# Patient Record
Sex: MALE | Race: WHITE | Employment: OTHER | URBAN - METROPOLITAN AREA
[De-identification: names, ages, dates, MRNs, and addresses within clinical notes are randomized per-mention and may not be internally consistent; named-entity substitution may affect disease eponyms.]

---

## 2020-03-02 ENCOUNTER — ANCILLARY PROCEDURE (OUTPATIENT)
Dept: GENERAL RADIOLOGY | Facility: CLINIC | Age: 65
End: 2020-03-02
Attending: ORTHOPAEDIC SURGERY
Payer: COMMERCIAL

## 2020-03-02 ENCOUNTER — OFFICE VISIT (OUTPATIENT)
Dept: ORTHOPEDICS | Facility: CLINIC | Age: 65
End: 2020-03-02
Payer: COMMERCIAL

## 2020-03-02 VITALS
RESPIRATION RATE: 16 BRPM | DIASTOLIC BLOOD PRESSURE: 78 MMHG | BODY MASS INDEX: 23.8 KG/M2 | WEIGHT: 170 LBS | SYSTOLIC BLOOD PRESSURE: 120 MMHG | HEART RATE: 79 BPM | OXYGEN SATURATION: 97 % | HEIGHT: 71 IN

## 2020-03-02 DIAGNOSIS — M25.561 RIGHT KNEE PAIN, UNSPECIFIED CHRONICITY: ICD-10-CM

## 2020-03-02 DIAGNOSIS — M25.561 RIGHT KNEE PAIN, UNSPECIFIED CHRONICITY: Primary | ICD-10-CM

## 2020-03-02 DIAGNOSIS — M17.11 PRIMARY OSTEOARTHRITIS OF RIGHT KNEE: ICD-10-CM

## 2020-03-02 PROCEDURE — 73562 X-RAY EXAM OF KNEE 3: CPT | Mod: RT

## 2020-03-02 PROCEDURE — 99204 OFFICE O/P NEW MOD 45 MIN: CPT | Performed by: ORTHOPAEDIC SURGERY

## 2020-03-02 RX ORDER — NAPROXEN 500 MG/1
500 TABLET ORAL 2 TIMES DAILY WITH MEALS
Qty: 60 TABLET | Refills: 11 | Status: SHIPPED | OUTPATIENT
Start: 2020-03-02 | End: 2021-03-30

## 2020-03-02 ASSESSMENT — MIFFLIN-ST. JEOR: SCORE: 1593.24

## 2020-03-02 NOTE — PATIENT INSTRUCTIONS
SMOKING CESSATION  What's in cigarette smoke? - Cigarette smoke contains over 4,000 chemicals. Nicotine is one of the main ingredients which is an insecticide/herbicide. It is poisonous to our nervous system, increases blood clotting risk, and decreases the body's defenses to fight off infection. Another chemical is Carbon Monoxide is an asphyxiating gas that permanently binds to blood cells and blocks the transport of oxygen. This leads to tissue death and decreases your metabolism. Tar is a chemical that coats your lungs and trachea which impairs new oxygen coming in and carbon dioxide getting out of your body.   How does smoking impact surgery? - Smoking is particularly hazardous with regards to surgery. Surgery puts stress on the body and a smoker's body is already under strain from these chemicals. Putting the two together, especially for an elective surgery, could be a recipe for disaster. Smoking before and after surgery increases your risk of heart problems, slow wound healing, delayed bone healing, blood clots, wound infection and anesthesia complications.   What are the benefits of quitting? - In 20 minutes your blood pressure will drop back down to normal. In 8 hours the carbon monoxide (a toxic gas) levels in your blood stream will drop by half, and oxygen levels will return to normal. In 48 hours your chance of having a heart attack will have decreased. All nicotine will have left your body. Your sense of taste and smell will return to a normal level. In 72 hours your bronchial tubes will relax, and your energy levels will increase. In 2 weeks your circulation will increase, and it will continue to improve for the next 10 weeks.    Recommendations for elective surgery - Ideally, patients should quit smoking 8 weeks before and at least 2 weeks after elective surgery in order to avoid complications. Simply cutting back on the amount of cigarettes smoked per day does not offer any benefit or decrease the  risk of poor wound healing, heart problems, and infection. Smokers should also start taking Vitamin C and B for two weeks before surgery and two weeks after surgery.    Ways to Stop Smokin. Nicotine patches, lozenges, or gum  2. Support Groups  3. Medications (see below)    List of Medications:  1. Varenicline Tartrate (CHANTIX)   2. Bupropion HCL (WELLBUTRIN, ZYBAN) - note: make sure Wellbutrin is for smoking cessation and not other issues   3. Nicotine Patch (NICODERM)   4. Nicotine Inhaler (NICOTROL)   5. Nicotine Gum Nicotine Polacrilex   6. Nicotine Lozenge: Nicotine Polacrilex (COMMIT)   * [employer] offers a smoking support group as well!  Please visit: [email or web address]  If you are interested in these, ask about getting a prescription or talk to your primary care doctor about what may be the best way for you to quit.       Options for osteoarthritis.    Weight loss.  Legs feel better the lighter you are.  Stay active - walking, bicycle, swimming.  Avoid high impact.  Vitamins - Glucosamine 1500 mg/day and chondroitin sulfate 1200 mg/day. It works for dogs and horses, no proof it helps people.  Non-medical options (other things I've heard of):  * tart cherry juice is thought to be a natural anti-inflammatory.    *1 tablespoon of apple cider vinegar daily  *Turmeric is thought to be a natural anti-inflammatory.  *1 tablespoon of unflavored gelatin daily, [place] Gelatin or Kay Nutrajoint  *Ointments on joints - Icy Hot, BenGay, Capsaicin cream, Mineral Ice, Flexall, diclofenac gel.  Tylenol up to 3000 mg / day.  NSAIDs - Aleve up to 4 tablets per day or ibuprofen up to 12 tablets per day.   Prescription forms.  Steroid injection - cortisone.  Lubricant injection.   brace.  Surgery.  Usually joint replacement.

## 2020-03-02 NOTE — LETTER
3/2/2020         RE: Mickey Durant  [address]  [place] [address]        Dear Colleague,    Thank you for referring your patient, Mikcey Durant, to the [facility]. Please see a copy of my visit note below.    Mickey Durant is a 64 year old male who is seen in consultation at the request of Keisha Blanton for right knee pain.  He had pain in his knee since about 2010.  Pain is mainly over the inner aspect of the knee.  He does not recall a specific injury.  It seems to be worse with exercise.  He has constant pain rated 5 out of 10.  He has previously had left knee arthroscopy years ago followed by hemiarthroplasty which was then converted to total knee arthroplasty 2 years ago.  He has been putting up with his right knee and feels at this point that he wants right knee replacement.  He has used low-dose Tylenol and ibuprofen for pain.  He alternates 3 OTC ibuprofen and 3 Tylenol every other day.  He has not had steroid injection of his right knee.  He reports steroid injection in the past has not helped his back, so he was not interested in it for his knee.  He has not had physical therapy for the knee.    He has had MRI scan 2/26/20 of the knee showing severe arthritis of the medial joint.  There was also complex tearing of the medial and lateral meniscus.  X-ray of the right knee performed today does show significant wear on the medial joint.  At the far medial aspect it is touching bone-on-bone.  Most of the medial joint has very thin cartilage.    History reviewed. No pertinent past medical history.    History reviewed. No pertinent surgical history.    History reviewed. No pertinent family history.    Social History     Socioeconomic History     Marital status:      Spouse name: Not on file     Number of children: Not on file     Years of education: Not on file     Highest education level: Not on file   Occupational History     Not on file   Social  Needs     Financial resource strain: Not on file     Food insecurity:     Worry: Not on file     Inability: Not on file     Transportation needs:     Medical: Not on file     Non-medical: Not on file   Tobacco Use     Smoking status: Current Every Day Smoker     Packs/day: 0.50     Years: 40.00     Pack years: 20.00     Types: Cigarettes     Smokeless tobacco: Never Used   Substance and Sexual Activity     Alcohol use: Yes     Drug use: Yes     Sexual activity: Yes   Lifestyle     Physical activity:     Days per week: Not on file     Minutes per session: Not on file     Stress: Not on file   Relationships     Social connections:     Talks on phone: Not on file     Gets together: Not on file     Attends [employer] service: Not on file     Active member of club or organization: Not on file     Attends meetings of clubs or organizations: Not on file     Relationship status: Not on file     Intimate partner violence:     Fear of current or ex partner: Not on file     Emotionally abused: Not on file     Physically abused: Not on file     Forced sexual activity: Not on file   Other Topics Concern     Parent/sibling w/ CABG, MI or angioplasty before 65F 55M? Not Asked   Social History Narrative     Not on file       Current Outpatient Medications   Medication Sig Dispense Refill     Ascorbic Acid (VITAMIN C PO)        loratadine (CLARITIN) 10 MG tablet Take 10 mg by mouth daily       MELATONIN PO        naproxen (NAPROSYN) 500 MG tablet Take 1 tablet (500 mg) by mouth 2 times daily (with meals) 60 tablet 11     nicotine (NICODERM CQ) 21 MG/24HR patch 2h hr Place 1 patch onto the skin every 24 hours 30 patch 5       No Known Allergies    REVIEW OF SYSTEMS:  CONSTITUTIONAL:  NEGATIVE for fever, chills, change in weight, not feeling tired  SKIN:  NEGATIVE for worrisome rashes, no skin lumps, no skin ulcers and no non-healing wounds  EYES:  NEGATIVE for vision changes or irritation.  ENT/MOUTH:  NEGATIVE.  No hearing loss, no  "hoarseness, no difficulty swallowing.  RESP:  NEGATIVE. No cough or shortness of breath.  CV:  NEGATIVE for chest pain, palpitations or peripheral edema  GI:  NEGATIVE for nausea, abdominal pain, heartburn, or change in bowel habits  :  Negative. No dysuria, no hematuria  MUSCULOSKELETAL:  See HPI above  NEURO:  NEGATIVE . No headaches, no dizziness,  no numbness  ENDOCRINE:  NEGATIVE for temperature intolerance, skin/hair changes  HEME/ALLERGY/IMMUNE:  NEGATIVE for bleeding problems  PSYCHIATRIC:  NEGATIVE. no anxiety, no depression.      Exam:  Vitals: /78   Pulse 79   Resp 16   Ht 1.803 m (5' 11\")   Wt 77.1 kg (170 lb)   SpO2 97%   BMI 23.71 kg/m     BMI= Body mass index is 23.71 kg/m .  Constitutional:  healthy, alert and no distress  Neuro: Alert and Oriented x 3, Sensation grossly WNL.  HEENT:  Atraumatic, EOMI  Neck:  Neck supple with no tenderness.  Psych: Affect normal   Respiratory: Breathing not labored.  Cardiovascular: normal peripheral pulses  Lymph: no adenopathy  Skin: No rashes,worrisome lesions or skin problems  Spine: straight, no straight leg raising pain.  Hips show full range of motion.  There is no tenderness over the sacro-iliac joints, sciatic notch, or greater trochanters.   He has good range of motion of both knees from 0-125 degrees.    He had no ligamentous laxity of MCL, LCL, or cruciates.  He has no significant effusion.  No warmth or erythema.  Sensation, motor and circulation are intact.    Assessment: Right knee osteoarthritis which is fairly severe.  He has not tried conservative treatment as of yet however except for a small dose of ibuprofen and Tylenol.  I feel it is doubtful that we can get prior authorization for knee replacement approved without conservative treatment.  I recommended he start off with a prescription dose of anti-inflammatory as he has been on a very small dose of ibuprofen.  I will prescribe Naprosyn 500 twice daily.  If this fails consider " steroid injection.  If anti-inflammatories and steroid injection feel we could consider knee replacement.  I also discussed physical therapy, but he did not wish to pursue that.    Again, thank you for allowing me to participate in the care of your patient.        Sincerely,        Barry Zambrano MD

## 2020-03-03 NOTE — PROGRESS NOTES
Mickey Durant is a 64 year old male who is seen in consultation at the request of Keisha Blanton for right knee pain.  He had pain in his knee since about 2010.  Pain is mainly over the inner aspect of the knee.  He does not recall a specific injury.  It seems to be worse with exercise.  He has constant pain rated 5 out of 10.  He has previously had left knee arthroscopy years ago followed by hemiarthroplasty which was then converted to total knee arthroplasty 2 years ago.  He has been putting up with his right knee and feels at this point that he wants right knee replacement.  He has used low-dose Tylenol and ibuprofen for pain.  He alternates 3 OTC ibuprofen and 3 Tylenol every other day.  He has not had steroid injection of his right knee.  He reports steroid injection in the past has not helped his back, so he was not interested in it for his knee.  He has not had physical therapy for the knee.    He has had MRI scan 2/26/20 of the knee showing severe arthritis of the medial joint.  There was also complex tearing of the medial and lateral meniscus.  X-ray of the right knee performed today does show significant wear on the medial joint.  At the far medial aspect it is touching bone-on-bone.  Most of the medial joint has very thin cartilage.    History reviewed. No pertinent past medical history.    History reviewed. No pertinent surgical history.    History reviewed. No pertinent family history.    Social History     Socioeconomic History     Marital status:      Spouse name: Not on file     Number of children: Not on file     Years of education: Not on file     Highest education level: Not on file   Occupational History     Not on file   Social Needs     Financial resource strain: Not on file     Food insecurity:     Worry: Not on file     Inability: Not on file     Transportation needs:     Medical: Not on file     Non-medical: Not on file   Tobacco Use     Smoking status: Current Every Day Smoker      Packs/day: 0.50     Years: 40.00     Pack years: 20.00     Types: Cigarettes     Smokeless tobacco: Never Used   Substance and Sexual Activity     Alcohol use: Yes     Drug use: Yes     Sexual activity: Yes   Lifestyle     Physical activity:     Days per week: Not on file     Minutes per session: Not on file     Stress: Not on file   Relationships     Social connections:     Talks on phone: Not on file     Gets together: Not on file     Attends [employer] service: Not on file     Active member of club or organization: Not on file     Attends meetings of clubs or organizations: Not on file     Relationship status: Not on file     Intimate partner violence:     Fear of current or ex partner: Not on file     Emotionally abused: Not on file     Physically abused: Not on file     Forced sexual activity: Not on file   Other Topics Concern     Parent/sibling w/ CABG, MI or angioplasty before 65F 55M? Not Asked   Social History Narrative     Not on file       Current Outpatient Medications   Medication Sig Dispense Refill     Ascorbic Acid (VITAMIN C PO)        loratadine (CLARITIN) 10 MG tablet Take 10 mg by mouth daily       MELATONIN PO        naproxen (NAPROSYN) 500 MG tablet Take 1 tablet (500 mg) by mouth 2 times daily (with meals) 60 tablet 11     nicotine (NICODERM CQ) 21 MG/24HR patch 2h hr Place 1 patch onto the skin every 24 hours 30 patch 5       No Known Allergies    REVIEW OF SYSTEMS:  CONSTITUTIONAL:  NEGATIVE for fever, chills, change in weight, not feeling tired  SKIN:  NEGATIVE for worrisome rashes, no skin lumps, no skin ulcers and no non-healing wounds  EYES:  NEGATIVE for vision changes or irritation.  ENT/MOUTH:  NEGATIVE.  No hearing loss, no hoarseness, no difficulty swallowing.  RESP:  NEGATIVE. No cough or shortness of breath.  CV:  NEGATIVE for chest pain, palpitations or peripheral edema  GI:  NEGATIVE for nausea, abdominal pain, heartburn, or change in bowel habits  :  Negative. No dysuria,  "no hematuria  MUSCULOSKELETAL:  See HPI above  NEURO:  NEGATIVE . No headaches, no dizziness,  no numbness  ENDOCRINE:  NEGATIVE for temperature intolerance, skin/hair changes  HEME/ALLERGY/IMMUNE:  NEGATIVE for bleeding problems  PSYCHIATRIC:  NEGATIVE. no anxiety, no depression.      Exam:  Vitals: /78   Pulse 79   Resp 16   Ht 1.803 m (5' 11\")   Wt 77.1 kg (170 lb)   SpO2 97%   BMI 23.71 kg/m    BMI= Body mass index is 23.71 kg/m .  Constitutional:  healthy, alert and no distress  Neuro: Alert and Oriented x 3, Sensation grossly WNL.  HEENT:  Atraumatic, EOMI  Neck:  Neck supple with no tenderness.  Psych: Affect normal   Respiratory: Breathing not labored.  Cardiovascular: normal peripheral pulses  Lymph: no adenopathy  Skin: No rashes,worrisome lesions or skin problems  Spine: straight, no straight leg raising pain.  Hips show full range of motion.  There is no tenderness over the sacro-iliac joints, sciatic notch, or greater trochanters.   He has good range of motion of both knees from 0-125 degrees.    He had no ligamentous laxity of MCL, LCL, or cruciates.  He has no significant effusion.  No warmth or erythema.  Sensation, motor and circulation are intact.    Assessment: Right knee osteoarthritis which is fairly severe.  He has not tried conservative treatment as of yet however except for a small dose of ibuprofen and Tylenol.  I feel it is doubtful that we can get prior authorization for knee replacement approved without conservative treatment.  I recommended he start off with a prescription dose of anti-inflammatory as he has been on a very small dose of ibuprofen.  I will prescribe Naprosyn 500 twice daily.  If this fails consider steroid injection.  If anti-inflammatories and steroid injection feel we could consider knee replacement.  I also discussed physical therapy, but he did not wish to pursue that.  "

## 2021-03-30 DIAGNOSIS — M17.11 PRIMARY OSTEOARTHRITIS OF RIGHT KNEE: ICD-10-CM

## 2021-03-30 RX ORDER — NAPROXEN 500 MG/1
500 TABLET ORAL 2 TIMES DAILY WITH MEALS
Qty: 60 TABLET | Refills: 11 | Status: SHIPPED | OUTPATIENT
Start: 2021-03-30